# Patient Record
Sex: FEMALE | Race: WHITE | NOT HISPANIC OR LATINO | Employment: PART TIME | URBAN - METROPOLITAN AREA
[De-identification: names, ages, dates, MRNs, and addresses within clinical notes are randomized per-mention and may not be internally consistent; named-entity substitution may affect disease eponyms.]

---

## 2018-06-06 ENCOUNTER — APPOINTMENT (OUTPATIENT)
Dept: WOMENS IMAGING | Facility: HOSPITAL | Age: 58
End: 2018-06-06

## 2018-06-06 PROCEDURE — 77067 SCR MAMMO BI INCL CAD: CPT | Performed by: RADIOLOGY

## 2018-06-06 PROCEDURE — 77063 BREAST TOMOSYNTHESIS BI: CPT | Performed by: RADIOLOGY

## 2019-06-10 ENCOUNTER — APPOINTMENT (OUTPATIENT)
Dept: WOMENS IMAGING | Facility: HOSPITAL | Age: 59
End: 2019-06-10

## 2019-06-10 PROCEDURE — 77063 BREAST TOMOSYNTHESIS BI: CPT | Performed by: RADIOLOGY

## 2019-06-10 PROCEDURE — 77067 SCR MAMMO BI INCL CAD: CPT | Performed by: RADIOLOGY

## 2019-06-13 ENCOUNTER — TELEPHONE (OUTPATIENT)
Dept: SURGERY | Facility: CLINIC | Age: 59
End: 2019-06-13

## 2019-06-13 NOTE — TELEPHONE ENCOUNTER
----- Message from Jerrell Guardado MD sent at 6/13/2019  3:09 PM EDT -----  Please make sure [facility] scheduled her for U/S (they recommended bilateral U/S after her last mammogram)

## 2019-06-28 ENCOUNTER — APPOINTMENT (OUTPATIENT)
Dept: WOMENS IMAGING | Facility: HOSPITAL | Age: 59
End: 2019-06-28

## 2019-06-28 PROCEDURE — 76641 ULTRASOUND BREAST COMPLETE: CPT | Performed by: RADIOLOGY

## 2020-03-23 ENCOUNTER — APPOINTMENT (OUTPATIENT)
Dept: PREADMISSION TESTING | Facility: HOSPITAL | Age: 60
End: 2020-03-23

## 2020-04-22 ENCOUNTER — TRANSCRIBE ORDERS (OUTPATIENT)
Dept: ADMINISTRATIVE | Facility: HOSPITAL | Age: 60
End: 2020-04-22

## 2020-04-22 ENCOUNTER — HOSPITAL ENCOUNTER (OUTPATIENT)
Dept: CARDIOLOGY | Facility: HOSPITAL | Age: 60
Discharge: HOME | End: 2020-04-22
Admitting: ANESTHESIOLOGY

## 2020-04-22 ENCOUNTER — TRANSCRIBE ORDERS (OUTPATIENT)
Dept: CARDIOLOGY | Facility: HOSPITAL | Age: 60
End: 2020-04-22

## 2020-04-22 ENCOUNTER — LAB (OUTPATIENT)
Dept: LAB | Facility: HOSPITAL | Age: 60
End: 2020-04-22

## 2020-04-22 DIAGNOSIS — M75.101 ROTATOR CUFF SYNDROME OF RIGHT SHOULDER: ICD-10-CM

## 2020-04-22 DIAGNOSIS — Z01.811 PRE-OP CHEST EXAM: Primary | ICD-10-CM

## 2020-04-22 DIAGNOSIS — M75.101 ROTATOR CUFF SYNDROME OF RIGHT SHOULDER: Primary | ICD-10-CM

## 2020-04-22 LAB
ANION GAP SERPL CALCULATED.3IONS-SCNC: 10.5 MMOL/L (ref 5–15)
BASOPHILS # BLD AUTO: 0.04 10*3/MM3 (ref 0–0.2)
BASOPHILS NFR BLD AUTO: 0.6 % (ref 0–1.5)
BUN BLD-MCNC: 11 MG/DL (ref 6–20)
BUN/CREAT SERPL: 16.7 (ref 7–25)
CALCIUM SPEC-SCNC: 9.6 MG/DL (ref 8.6–10.5)
CHLORIDE SERPL-SCNC: 99 MMOL/L (ref 98–107)
CO2 SERPL-SCNC: 25.5 MMOL/L (ref 22–29)
CREAT BLD-MCNC: 0.66 MG/DL (ref 0.57–1)
DEPRECATED RDW RBC AUTO: 45.5 FL (ref 37–54)
EOSINOPHIL # BLD AUTO: 0.26 10*3/MM3 (ref 0–0.4)
EOSINOPHIL NFR BLD AUTO: 3.9 % (ref 0.3–6.2)
ERYTHROCYTE [DISTWIDTH] IN BLOOD BY AUTOMATED COUNT: 12.6 % (ref 12.3–15.4)
GFR SERPL CREATININE-BSD FRML MDRD: 92 ML/MIN/1.73
GLUCOSE BLD-MCNC: 100 MG/DL (ref 65–99)
HCT VFR BLD AUTO: 42.3 % (ref 34–46.6)
HGB BLD-MCNC: 14.1 G/DL (ref 12–15.9)
IMM GRANULOCYTES # BLD AUTO: 0.02 10*3/MM3 (ref 0–0.05)
IMM GRANULOCYTES NFR BLD AUTO: 0.3 % (ref 0–0.5)
LYMPHOCYTES # BLD AUTO: 1.72 10*3/MM3 (ref 0.7–3.1)
LYMPHOCYTES NFR BLD AUTO: 25.7 % (ref 19.6–45.3)
MCH RBC QN AUTO: 32.6 PG (ref 26.6–33)
MCHC RBC AUTO-ENTMCNC: 33.3 G/DL (ref 31.5–35.7)
MCV RBC AUTO: 97.7 FL (ref 79–97)
MONOCYTES # BLD AUTO: 0.68 10*3/MM3 (ref 0.1–0.9)
MONOCYTES NFR BLD AUTO: 10.2 % (ref 5–12)
NEUTROPHILS # BLD AUTO: 3.96 10*3/MM3 (ref 1.7–7)
NEUTROPHILS NFR BLD AUTO: 59.3 % (ref 42.7–76)
NRBC BLD AUTO-RTO: 0 /100 WBC (ref 0–0.2)
PLATELET # BLD AUTO: 228 10*3/MM3 (ref 140–450)
PMV BLD AUTO: 10 FL (ref 6–12)
POTASSIUM BLD-SCNC: 4.8 MMOL/L (ref 3.5–5.2)
RBC # BLD AUTO: 4.33 10*6/MM3 (ref 3.77–5.28)
SODIUM BLD-SCNC: 135 MMOL/L (ref 136–145)
WBC NRBC COR # BLD: 6.68 10*3/MM3 (ref 3.4–10.8)

## 2020-04-22 PROCEDURE — 80048 BASIC METABOLIC PNL TOTAL CA: CPT

## 2020-04-22 PROCEDURE — 85025 COMPLETE CBC W/AUTO DIFF WBC: CPT

## 2020-04-22 PROCEDURE — 93005 ELECTROCARDIOGRAM TRACING: CPT

## 2020-04-22 PROCEDURE — 93010 ELECTROCARDIOGRAM REPORT: CPT | Performed by: INTERNAL MEDICINE

## 2020-04-22 PROCEDURE — 36415 COLL VENOUS BLD VENIPUNCTURE: CPT

## 2020-11-05 ENCOUNTER — APPOINTMENT (OUTPATIENT)
Dept: WOMENS IMAGING | Facility: HOSPITAL | Age: 60
End: 2020-11-05

## 2020-11-05 PROCEDURE — 77067 SCR MAMMO BI INCL CAD: CPT | Performed by: RADIOLOGY

## 2020-11-05 PROCEDURE — 77063 BREAST TOMOSYNTHESIS BI: CPT | Performed by: RADIOLOGY

## 2021-03-30 ENCOUNTER — BULK ORDERING (OUTPATIENT)
Dept: CASE MANAGEMENT | Facility: OTHER | Age: 61
End: 2021-03-30

## 2021-03-30 DIAGNOSIS — Z23 IMMUNIZATION DUE: ICD-10-CM

## 2024-10-27 ENCOUNTER — HOSPITAL ENCOUNTER (EMERGENCY)
Facility: HOSPITAL | Age: 64
Discharge: HOME | End: 2024-10-27
Attending: EMERGENCY MEDICINE | Admitting: EMERGENCY MEDICINE
Payer: COMMERCIAL

## 2024-10-27 ENCOUNTER — APPOINTMENT (OUTPATIENT)
Dept: GENERAL RADIOLOGY | Facility: HOSPITAL | Age: 64
End: 2024-10-27
Payer: COMMERCIAL

## 2024-10-27 VITALS
BODY MASS INDEX: 21.66 KG/M2 | WEIGHT: 130 LBS | HEART RATE: 92 BPM | TEMPERATURE: 98.1 F | DIASTOLIC BLOOD PRESSURE: 94 MMHG | OXYGEN SATURATION: 98 % | HEIGHT: 65 IN | RESPIRATION RATE: 20 BRPM | SYSTOLIC BLOOD PRESSURE: 129 MMHG

## 2024-10-27 DIAGNOSIS — S52.531A CLOSED COLLES' FRACTURE OF RIGHT RADIUS, INITIAL ENCOUNTER: Primary | ICD-10-CM

## 2024-10-27 PROCEDURE — 99283 EMERGENCY DEPT VISIT LOW MDM: CPT

## 2024-10-27 PROCEDURE — 73110 X-RAY EXAM OF WRIST: CPT

## 2024-10-27 RX ORDER — HYDROCODONE BITARTRATE AND ACETAMINOPHEN 5; 325 MG/1; MG/1
1 TABLET ORAL EVERY 6 HOURS PRN
Qty: 12 TABLET | Refills: 0 | Status: SHIPPED | OUTPATIENT
Start: 2024-10-27

## 2024-10-27 NOTE — DISCHARGE INSTRUCTIONS
Do not combine the hydrocodone with your tramadol.  Please call your pain management doctor on Monday and notify them that you have been prescribed a another narcotic medication for an acute fracture.

## 2024-10-27 NOTE — ED NOTES
Patient arrives via pv from home for complaints of a fall around 0800. Patient states she lost her balance and tried to protect her head. Patient complains of a right wrist injury. Alert and oriented x4. No head injury.

## 2024-10-27 NOTE — ED PROVIDER NOTES
EMERGENCY DEPARTMENT ENCOUNTER    Room Number:  35/35  Date of encounter:  10/27/2024  PCP: Sonali Schaefer MD  Historian: Patient  Chronic or social conditions impacting care (social determinants of health): None    HPI:  Chief Complaint: Right wrist injury  A complete HPI/ROS/PMH/PSH/SH/FH are unobtainable due to: Nothing    Context: Taya Ryan is a 64 y.o. female with a history of hypertension, who presents to the ED c/o acute right wrist pain after mechanical fall prior to arrival.  Patient states that she was getting dressed when she lost her balance.  She fell backwards and put her right arm behind her head to protect it if she hit the ground.  She denies any head trauma, LOC.  She takes no blood thinners.  She does complain of diffuse dorsal right wrist pain.  She is right-handed.    Review of prior external notes (non-ED):   I reviewed internal medicine office visit from 5/21/2024.  Patient being followed for hypertension, back pain.    Review of prior external test results outside of this encounter:  I reviewed a CMP from 1/9/2024.  Potassium 5.3, creatinine 0.68    PAST MEDICAL HISTORY  Active Ambulatory Problems     Diagnosis Date Noted    No Active Ambulatory Problems     Resolved Ambulatory Problems     Diagnosis Date Noted    No Resolved Ambulatory Problems     No Additional Past Medical History         PAST SURGICAL HISTORY  No past surgical history on file.      FAMILY HISTORY  No family history on file.      SOCIAL HISTORY  Social History     Socioeconomic History    Marital status:          ALLERGIES  Patient has no known allergies.        REVIEW OF SYSTEMS  All systems reviewed and negative except for those discussed in HPI.       PHYSICAL EXAM    I have reviewed the triage vital signs and nursing notes.    ED Triage Vitals   Temp Heart Rate Resp BP SpO2   10/27/24 1231 10/27/24 1231 10/27/24 1235 10/27/24 1235 10/27/24 1231   98.1 °F (36.7 °C) 115 20 150/97 95 %      Temp src  Heart Rate Source Patient Position BP Location FiO2 (%)   -- -- 10/27/24 1235 10/27/24 1235 --     Sitting Right arm        Physical Exam  GENERAL: Alert, oriented, not distressed  HENT: head atraumatic, no cervical tenderness  EYES: no scleral icterus, EOMI  CV: regular rhythm, regular rate, no murmur  RESPIRATORY: normal effort, CTA  ABDOMEN: soft, nontender  MUSCULOSKELETAL: Mild swelling and ecchymosis over the distal ulnar forearm.  Mild tenderness and swelling to the right wrist without significant deformity.  No snuffbox tenderness.  Preserved range of motion of the right wrist.  Neurovascularly intact distally.  NEURO: alert, moves all extremities, follows commands  SKIN: warm, dry    RADIOLOGY  XR Wrist 3+ View Right    Result Date: 10/27/2024  XR WRIST 3+ VW RIGHT-10/27/2024  HISTORY: Fell, left wrist injury.  There is a slightly impacted fracture of the distal metadiaphyseal portion of the right radius. There may be a fracture of the ulnar styloid process as well.  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  No significant angulation is seen in the distal radius fracture.      1. Distal right radius fracture. 2. There may be a fracture of the ulnar styloid process as well.   This report was finalized on 10/27/2024 1:49 PM by Dr. Adán Telles M.D on Workstation: [id number]       I ordered the above noted radiological studies. Reviewed by me and discussed with radiologist.  See dictation for official radiology interpretation.      MEDICATIONS GIVEN IN ER    Medications - No data to display    Splint - Cast - Strapping    Date/Time: 10/27/2024 1:57 PM    Performed by: Bowen José PA  Authorized by: Alcides Moreno MD    Consent:     Consent obtained:  Verbal    Consent given by:  Patient  Universal protocol:     Patient identity confirmed:  Verbally with patient  Pre-procedure details:     Distal neurologic exam:  Normal    Distal perfusion: distal pulses strong     Procedure details:     Location:  Wrist    Wrist location:  R wrist    Cast type:  Short arm    Splint type:  Sugar tong    Supplies used: Ortho-Glass.    Attestation: Splint applied and adjusted personally by me    Post-procedure details:     Distal neurologic exam:  Normal    Distal perfusion: distal pulses strong      Procedure completion:  Tolerated well, no immediate complications      ADDITIONAL ORDERS CONSIDERED BUT NOT ORDERED:  Nothing    PROGRESS, DATA ANALYSIS, CONSULTS, AND MEDICAL DECISION MAKING    All labs have been independently interpreted by myself.  All radiology studies have been independently interpreted by myself and discussed with radiologist dictating the report.   EKGs independently interpreted by myself.  Discussion below represents my analysis of pertinent findings related to patient's condition, differential diagnosis, treatment plan and final disposition.    I have discussed case with Dr. Moreno, emergency room physician.  He has performed his own bedside examination and agrees with treatment plan.    ED Course as of 10/27/24 1356   Sun Oct 27, 2024   1254 Patient complains of right wrist pain after mechanical fall prior to arrival.  No significant deformity.  Neurovascularly intact distally.  Plan for x-rays. [EE]   1313 Right wrist films independently interpreted myself show a fracture of the distal radius. [EE]   1334 Patient has been splinted and is neurovascularly intact post application.  We will discharge to orthopedics. [EE]      ED Course User Index  [EE] Bowen José PA       AS OF 13:56 EDT VITALS:    BP - 129/94  HR - 92  TEMP - 98.1 °F (36.7 °C)  O2 SATS - 98%        DIAGNOSIS  Final diagnoses:   Closed Colles' fracture of right radius, initial encounter         DISPOSITION  Discharged    Admission was considered but after careful review of the patient's presentation, physical examination, diagnostic results, and response to treatment the patient may be safely  discharged with outpatient follow-up.         Dictated utilizing David dillation     Bowen José PA  10/27/24 2790

## 2024-10-27 NOTE — ED PROVIDER NOTES
MD ATTESTATION NOTE    The KOJO and I have discussed this patient's history, physical exam, MDM, and treatment plan.  I have reviewed the documentation and personally had a face to face interaction with the patient. I affirm the documentation and agree with the treatment and plan.  The attached note describes my personal findings.      I provided a substantive portion of the medical decision making.        Brief HPI: Patient presents with acute right wrist pain after a fall prior to arrival.  She denies head trauma or loss of consciousness.    PHYSICAL EXAM  ED Triage Vitals   Temp Heart Rate Resp BP SpO2   10/27/24 1231 10/27/24 1231 10/27/24 1235 10/27/24 1235 10/27/24 1231   98.1 °F (36.7 °C) 115 20 150/97 95 %      Temp src Heart Rate Source Patient Position BP Location FiO2 (%)   -- -- 10/27/24 1235 10/27/24 1235 --     Sitting Right arm          GENERAL: Awake and alert, no acute distress  HENT: nares patent  EYES: no scleral icterus  CV: regular rhythm, normal rate  RESPIRATORY: normal effort  MUSCULOSKELETAL: Right wrist is immobilized in a sling.  There is brisk cap refill in all digits of the right hand with intact flexion and extension at the MCP, PIP, DIP joints throughout the right hand.  NEURO: alert, moves all extremities, follows commands, normal sensation to light touch throughout fingertips of the right hand.  PSYCH:  calm, cooperative  SKIN: warm, dry    Vital signs and nursing notes reviewed.        Medical Decision Making:  ED Course as of 10/27/24 1348   Sun Oct 27, 2024   1254 Patient complains of right wrist pain after mechanical fall prior to arrival.  No significant deformity.  Neurovascularly intact distally.  Plan for x-rays. [EE]   1313 Right wrist films independently interpreted myself show a fracture of the distal radius. [EE]   1334 Patient has been splinted and is neurovascularly intact post application.  We will discharge to orthopedics. [EE]      ED Course User Index  [EE] Arnav  Bowen LEE, ELENA ARANA query complete. Treatment plan to include limited course of prescribed  controlled substance. Risks including addiction, benefits, and alternatives presented to patient.                  Alcides Moreno MD  10/27/24 0614